# Patient Record
(demographics unavailable — no encounter records)

---

## 2024-11-14 NOTE — HISTORY OF PRESENT ILLNESS
[de-identified] : left  hip pain pain getting into and out of a car  NAD left hip:  no skin breakdown FF 0-110 ER 40 IR 20 ttp ASIS NVI comp soft and nt  Xray left hip: jt space preserved, mild degen  mri left hip: l5-s1  Plan: went over findings explained the imaging and hip flexor pain will start pt will send to ortho spine for low back PM for possible injection

## 2024-12-16 NOTE — PROCEDURE
[FreeTextEntry3] :  Date of Service: 12/12/2024   Account: [id number]   Patient: RAFAELA DALAL   YOB: 1961   Age: 63 year   Surgeon:                  Kike De La Cruz DO   Assistant:                None   Pre-Operative Diagnosis:   Lumbar Spondylosis     Post Operative Diagnosis:  Lumbar Spondylosis   Procedure:             Left (L3, L4, L5 medial branch) L4-5, L5-S1 facet joint block under fluoroscopic guidance.   Anesthesia:            none   This procedure was carried out using fluoroscopic guidance.  The risks and benefits of the procedure were discussed extensively with the patient.  The consent of the patient was obtained, and the following procedure was performed. The patient was placed in the prone position on the fluoroscopy table and the area was prepped and draped in a sterile fashion.  A timeout was performed with all essential staff present and the site and side were verified.   The lumbar vertebral bodies were identified, and the fluoroscope was obliqued ipsilateral to approximately 30 degrees to reveal the appropriate anatomical view.  The junction of the superior articulate process and transverse process at the left L4, and L5 levels were identified and marked. A spinal needle was then introduced and advanced to the above target points at the junction of the SAP and transverse processes until bone was contacted.   Fluoroscope then focused on the left sacral ala on A/P view and marked at this point.  A spinal needle was then advanced under fluoroscopic guidance until bone was contacted at the ala.   After negative aspiration for heme and CSF, an 1 cc of a mixture of 0.5% Marcaine and 10mg decadron was injected at each of the injection sites.    The needles were subsequently removed.  Vital signs remained normal.  Pulse oximeter was used throughout the procedure and the patient's pulse and oxygen saturation remained within normal limits.  The patient tolerated the procedure well.  There were no complications.  The patient was instructed to apply ice over the injection sites for twenty minutes every two hours for the next 24 to 48 hours.   Disposition:        1. The patient was advised to F/U in 1-2 weeks to assess the response to the injection.      2. They were advised to keep a pain diary to report the results of the diagnostic block at their FUV.      3. The patient was also instructed to contact me immediately if there were any concerns related to the procedure performed.

## 2024-12-20 NOTE — DISCUSSION/SUMMARY
[de-identified] : A discussion regarding available pain management treatment options occurred with the patient. These included interventional, rehabilitative, pharmacological, and alternative modalities. We will proceed with the following:   Interventional treatment options: 1. Proceed with a Left hip injection under fluoroscopic guidance. The patient is having significant left hip pain with minimal relief with conservative treatments so we decided to proceed with an intra-articular hip injection. The risks and benefits were discussed which included bruising, hematoma, worse pain, intravascular injection, steroid reaction. All questions were answered and concerns addressed. The patient understands that this is likely a temporary solution to their pain. The patient may have up to three intra-articular joint injections a year and needs to consider surgical options for longer term relief of pain should they not improve. They will schedule at the earliest convenience.   - Follow up 2 weeks post injection.   I Brittni Greene attest that this documentation has been prepared under the direction and in the presence of provider Dr. Kike De La Cruz.  The documentation recorded by the scribe in my presence, accurately reflects the service I personally performed, and the decisions made by me with my edits as appropriate.   Kike De La Cruz, DO

## 2024-12-20 NOTE — HISTORY OF PRESENT ILLNESS
[FreeTextEntry1] : Ms. Jamison is a 63-year-old female presenting to establish care for her left hip pain. She is accompanied by her . She is being referred by Dr. Queen to undergo interventional treatments. The pain has been ongoing for many years without any inciting event or known injury. Over the last year, the pain has been progressing. The pain is in the left hip area with referred pain into the groin and into the anterior portion of the thigh. She does also note pain in the left buttock with referred pain into the posterior portion of the thigh. Her pain is present with sitting, transitioning from a seated to standing position or when standing/walking. She used to walk long distances with her  and over the last year, she has not been able to do so due to the pain. Her pain is associated with sharp, shooting, stabbing pains along with stiffness. Her pain is present daily and rated at a 7/10 on the pain scale.   Of note, she currently has an ulcer and is unable to take NSAIDs. She is also on Methotrexate for RA which inhibits her from taking any Tylenol.   12-: Revisit encounter. She is accompanied by her  today.  Since her last office visit, she underwent a Left L3, L4, L5 medial branch facet block on 12-. She had only minimal relief from this procedure. She is presenting today with pain which is mainly over the left buttock and hip area. Her lower back pain is the most severe in the morning when getting up out of bed. She has significant stiffness which causes her to gingerly move around in the morning. She rates the pain at a 7/10 on the pain scale.

## 2024-12-20 NOTE — PHYSICAL EXAM
[de-identified] : Left hip: Inspection: no evidence of atrophy, effusion, rash     Palpation: + tenderness over anterior hip, adductors, PSIS, gluteus, GTB    Stability: no gross instability bilaterally    Alignment: normal    ROM:   Painful reduced ROM especially flexion, external and internal rotation.      Special tests:  MARY ELLEN + (groin pain)    Stability:  No gross instability bilaterally    L spine   No rashes, erythema, edema noted TTP b/l lumbar paraspinal muscles Positive facet loading bilaterally Positive KEMPS test bilaterally LLE: 5/5 strength RLE: 5/5 strength Sensation intact b/l LE

## 2024-12-20 NOTE — DATA REVIEWED
[FreeTextEntry1] : MRI of the left hip taken @ [facility] showed heterogenous increased intermediate signal in the anterior superior acetabular labrum without evidence for displaced labral tear or para labral cyst. Left foraminal acetabular joint is preserved without significant arthrosis or joint effusion. Severe degenerative disc disease in the lower lumbar spine L5-S1.

## 2024-12-20 NOTE — DISCUSSION/SUMMARY
[de-identified] : A discussion regarding available pain management treatment options occurred with the patient. These included interventional, rehabilitative, pharmacological, and alternative modalities. We will proceed with the following:   Interventional treatment options: 1. Proceed with a Left hip injection under fluoroscopic guidance. The patient is having significant left hip pain with minimal relief with conservative treatments so we decided to proceed with an intra-articular hip injection. The risks and benefits were discussed which included bruising, hematoma, worse pain, intravascular injection, steroid reaction. All questions were answered and concerns addressed. The patient understands that this is likely a temporary solution to their pain. The patient may have up to three intra-articular joint injections a year and needs to consider surgical options for longer term relief of pain should they not improve. They will schedule at the earliest convenience.   - Follow up 2 weeks post injection.   I Brittni Greene attest that this documentation has been prepared under the direction and in the presence of provider Dr. Kike De La Cruz.  The documentation recorded by the scribe in my presence, accurately reflects the service I personally performed, and the decisions made by me with my edits as appropriate.   Kike De La Cruz, DO

## 2024-12-20 NOTE — PHYSICAL EXAM
[de-identified] : Left hip: Inspection: no evidence of atrophy, effusion, rash     Palpation: + tenderness over anterior hip, adductors, PSIS, gluteus, GTB    Stability: no gross instability bilaterally    Alignment: normal    ROM:   Painful reduced ROM especially flexion, external and internal rotation.      Special tests:  MARY ELLEN + (groin pain)    Stability:  No gross instability bilaterally    L spine   No rashes, erythema, edema noted TTP b/l lumbar paraspinal muscles Positive facet loading bilaterally Positive KEMPS test bilaterally LLE: 5/5 strength RLE: 5/5 strength Sensation intact b/l LE

## 2025-01-02 NOTE — PROCEDURE
[FreeTextEntry3] : Date of Service: 12/30/2024  Account: [id number]  Patient: RAFAELA DALAL   YOB: 1961  Age: 63 year  Surgeon:      Kike De La Cruz DO  Pre-Operative Diagnosis: Left Primary Osteoarthritis of Hip (M16.0)  Post-Operative Diagnosis: Same  Procedure: Left Hip arthrogram and steroid injection under fluoroscopic guidance.    This procedure was carried out using fluoroscopic guidance. The risks and benefits of the procedure were discussed extensively with the patient. The consent of the patient was obtained and the following procedure was performed. The patient was placed in the supine position on the fluoroscopic table and the area was prepped and draped in a sterile fashion. A timeout was performed with all essential staff present and the site and side were verified.  The patient was placed in the supine position with left hip flexed and externally rotated 25 degrees. The area of the left groin was prepped and draped in a sterile fashion. The fluoroscopic image intensifier was then positioned so that the left hip appeared in view, and the midline intertrochanteric region was identified and marked. A 25 gauge spinal needle was then inserted and directed into this left hip intra-capsular region. After negative aspiration for heme and CSF, 3 cc of Omnipaque was injected and appeared to fill the joint margins.  Left hip arthrogram showed no intravascular flow, and good spread around the femoral head and to the acetabulum. An injectate of 4 cc 0.25% Marcaine plus 10mg decadron was then injected into the left hip space.   The needle was subsequently removed. Vital signs remained normal. Pulse oximeter was used throughout the procedure and the patient's pulse and oxygen saturation remained within normal limits. The patient tolerated the procedure well. There were no complications. The patient was instructed to apply ice over the injection sites for twenty minutes every two hours for the next 24 to 48 hours.  Disposition:   1. The patient was advised to F/U in 1-2 weeks to assess the response to the injection.  2. The patient was also instructed to contact me immediately if there were any concerns related to the procedure performed.

## 2025-01-17 NOTE — PHYSICAL EXAM
[de-identified] : Left hip: Inspection: no evidence of atrophy, effusion, rash     Palpation: NO  tenderness over anterior hip, adductors, PSIS, gluteus, GTB    Stability: no gross instability bilaterally    Alignment: normal    ROM:   No Pain or ROM  w/ flexion, external and internal rotation.      Special tests:  MARY ELLEN negative    Stability:  No gross instability bilaterally   + facet loading on LT lower back

## 2025-01-17 NOTE — DISCUSSION/SUMMARY
[de-identified] : A discussion regarding available pain management treatment options occurred with the patient. These included interventional, rehabilitative, pharmacological, and alternative modalities. We will proceed with the following:   Interventional/ Procedures: 1. None indicated at this time.   Rehabilitative/alternative modalities: 1. Initiate physician directed activity and lifestyle modifications. 2. Participation in active HEP was discussed and printed. 3. Patient was advised to stay away from any heavy lifting. If needed, she was advised to squat and not bend forward.    Total clinician time spent today on the patient is 20 minutes including preparing to see the patient, obtaining and/or reviewing and confirming history, performing medically necessary and appropriate examination, counseling and educating the patient and/or family, documenting clinical information in the EHR and communicating and/or referring to other healthcare professionals.  F/u as needed   BRUCE Mack, DO

## 2025-01-17 NOTE — PHYSICAL EXAM
[de-identified] : Left hip: Inspection: no evidence of atrophy, effusion, rash     Palpation: NO  tenderness over anterior hip, adductors, PSIS, gluteus, GTB    Stability: no gross instability bilaterally    Alignment: normal    ROM:   No Pain or ROM  w/ flexion, external and internal rotation.      Special tests:  MARY ELLEN negative    Stability:  No gross instability bilaterally   + facet loading on LT lower back

## 2025-01-17 NOTE — HISTORY OF PRESENT ILLNESS
[FreeTextEntry1] : Ms. Jamison is a 63-year-old female presenting to establish care for her left hip pain. She is accompanied by her . She is being referred by Dr. Queen to undergo interventional treatments. The pain has been ongoing for many years without any inciting event or known injury. Over the last year, the pain has been progressing. The pain is in the left hip area with referred pain into the groin and into the anterior portion of the thigh. She does also note pain in the left buttock with referred pain into the posterior portion of the thigh. Her pain is present with sitting, transitioning from a seated to standing position or when standing/walking. She used to walk long distances with her  and over the last year, she has not been able to do so due to the pain. Her pain is associated with sharp, shooting, stabbing pains along with stiffness. Her pain is present daily and rated at a 7/10 on the pain scale.   Of note, she currently has an ulcer and is unable to take NSAIDs. She is also on Methotrexate for RA which inhibits her from taking any Tylenol.   12-: Revisit encounter. She is accompanied by her  today.  Since her last office visit, she underwent a Left L3, L4, L5 medial branch facet block on 12-. She had only minimal relief from this procedure. She is presenting today with pain which is mainly over the left buttock and hip area. Her lower back pain is the most severe in the morning when getting up out of bed. She has significant stiffness which causes her to gingerly move around in the morning. She rates the pain at a 7/10 on the pain scale.   TODAY (1/16/2025) Patient is a 63-year-old female who is here today for follow-up status post left hip steroid injection which gave her 100% relief for 1 week and she now has 98% relief until to date.  Will hold off further injections since patient is feeling better and is able to carry on her daily activities with less pain.